# Patient Record
Sex: FEMALE | Race: WHITE | Employment: FULL TIME | URBAN - METROPOLITAN AREA
[De-identification: names, ages, dates, MRNs, and addresses within clinical notes are randomized per-mention and may not be internally consistent; named-entity substitution may affect disease eponyms.]

---

## 2020-06-04 ENCOUNTER — OFFICE VISIT (OUTPATIENT)
Dept: ORTHOPEDIC SURGERY | Age: 40
End: 2020-06-04

## 2020-06-04 VITALS — TEMPERATURE: 97.7 F | BODY MASS INDEX: 25.77 KG/M2 | HEIGHT: 70 IN | WEIGHT: 180 LBS

## 2020-06-04 PROCEDURE — 99999 PR OFFICE/OUTPT VISIT,PROCEDURE ONLY: CPT | Performed by: ORTHOPAEDIC SURGERY

## 2020-06-04 NOTE — PROGRESS NOTES
clubs or organizations: None     Relationship status: None    Intimate partner violence     Fear of current or ex partner: None     Emotionally abused: None     Physically abused: None     Forced sexual activity: None   Other Topics Concern    None   Social History Narrative    None     No current outpatient medications on file. No current facility-administered medications for this visit. No Known Allergies    REVIEW OF SYSTEMS:   No rashes today  No new numbness  No tingling  No fevers  No depression  No new onset of pain      Pertinent items are noted in HPI  Review of systems reviewed from Patient History Form dated on 6/4/2020 and available in the patient's chart under the Media tab. Examination:    Vitals: Temperature 97.7 °F (36.5 °C), height 5' 10\" (1.778 m), weight 180 lb (81.6 kg). ,  BMI Readings from Last 3 Encounters:   06/04/20 25.83 kg/m²     LOWER EXTREMITY EXAMINATION:  · Inspection: Local inspection shows no step-off or bruising. Hip alignment is normal. No instability is noted. · Constitutional: Patient is adequately groomed with no evidence of malnutrition  · Lymphatic: The lymphatic examination bilaterally reveals all areas to be without enlargement or induration. · Mental Status: The patient is oriented to time, place and person. The patient's mood and   affect are appropriate. · Vascular: Examination reveals no swelling or calf tenderness. Peripheral pulses are palpable and 2+. · Palpation:   No evidence of tenderness at the midline. Lumbar paraspinal tenderness No tenderness to palpation of the lumbar paraspinals  Bursal tenderness No tenderness bilaterally  There is no paraspinal spasm. · Range of Motion: very limited due to pain  · Strength:   Strength testing is 5/5 in all muscle groups tested. · Special Tests:   Straight leg raise and crossed SLR negative. Myles's testing is negative bilaterally.  FADIR's testing is negative

## 2020-06-18 ENCOUNTER — OFFICE VISIT (OUTPATIENT)
Dept: ORTHOPEDIC SURGERY | Age: 40
End: 2020-06-18

## 2020-06-18 VITALS — WEIGHT: 180 LBS | HEIGHT: 70 IN | TEMPERATURE: 97.4 F | BODY MASS INDEX: 25.77 KG/M2

## 2020-06-18 PROCEDURE — 99999 PR OFFICE/OUTPT VISIT,PROCEDURE ONLY: CPT | Performed by: ORTHOPAEDIC SURGERY

## 2020-06-18 NOTE — PROGRESS NOTES
Diagnoses: Fracture left foot fifth proximal phalanx fifth toe, infection, contusion, hip pathology, Muscle injury, bone tumor, femoral acetabular osteoarthritis,      Diagnosis fracture left foot proximal phalanx fifth toe comminuted with articular involvement      Plan: (Medical Decision Making)    Plan (Medical Decision Making):    I discussed the diagnosis and the treatment options with Rudolph Hoffman today. In Summary:  The various treatment options were outlined and discussed with Rudolph Hoffman including:  Conservative care options: physical therapy, ice, medications, bracing, and activity modification. The indications for therapeutic injections. The indications for additional imaging/laboratory studies. The indications for (possible future) interventions. After considering the various options discussed, Rudolph Hoffman elected to pursue a course of treatment that includes the followin. Medications: No further recommendations for new medications. 2. PT:  Prescribed home exercise program.    3. Further studies: No further studies. 4. Interventional: Follow-up in 2-2 and half weeks for another x-ray slowly start weaning out of the boot    5. Healthy Lifestyle Measures:  Patient education material reviewing the following was distributed to Rudolph Hoffman  Anatomic drawings  Healthy lifestyle education  Osteoporosis prevention  Advanced imaging preparedness      For further information regarding the spine conditions and to review interventional treatments the patient was directed to [facility].    6.  Follow up:  2-3 weeks    Rudolph Hoffman was instructed to call the office if her symptoms worsen or if new symptoms appear prior to the next scheduled visit. She is specifically instructed to contact the office between now & her scheduled appointment if she has concerns related to her condition or if she needs assistance in scheduling the above tests.  She is   welcome to call for an

## 2020-07-02 ENCOUNTER — OFFICE VISIT (OUTPATIENT)
Dept: ORTHOPEDIC SURGERY | Age: 40
End: 2020-07-02

## 2020-07-02 VITALS — WEIGHT: 179.9 LBS | HEIGHT: 70 IN | TEMPERATURE: 97.2 F | BODY MASS INDEX: 25.75 KG/M2

## 2020-07-02 PROCEDURE — 99999 PR OFFICE/OUTPT VISIT,PROCEDURE ONLY: CPT | Performed by: ORTHOPAEDIC SURGERY

## 2020-07-02 NOTE — PROGRESS NOTES
Other Topics Concern    Not on file   Social History Narrative    Not on file     No current outpatient medications on file. No current facility-administered medications for this visit. No Known Allergies    REVIEW OF SYSTEMS:   No rashes today  No new numbness  No tingling  No fevers  No depression  No new onset of pain      Pertinent items are noted in HPI  Review of systems reviewed from Patient History Form dated on 6/4/2020 and available in the patient's chart under the Media tab. Examination:    Vitals: There were no vitals taken for this visit.,  BMI Readings from Last 3 Encounters:   06/18/20 25.83 kg/m²   06/04/20 25.83 kg/m²     LOWER EXTREMITY EXAMINATION:  · Inspection: Local inspection shows no step-off or bruising. Hip alignment is normal. No instability is noted. · Constitutional: Patient is adequately groomed with no evidence of malnutrition  · Lymphatic: The lymphatic examination bilaterally reveals all areas to be without enlargement or induration. · Mental Status: The patient is oriented to time, place and person. The patient's mood and   affect are appropriate. · Vascular: Examination reveals no swelling or calf tenderness. Peripheral pulses are palpable and 2+. · Palpation:   No evidence of tenderness at the midline. Lumbar paraspinal tenderness No tenderness to palpation of the lumbar paraspinals  Bursal tenderness No tenderness bilaterally  There is no paraspinal spasm. · Range of Motion: pain-free ROM  · Strength:   Strength testing is 5/5 in all muscle groups tested. · Special Tests:   Straight leg raise and crossed SLR negative. Myles's testing is negative bilaterally. FADIR's testing is negative bilaterally. Slump test negative. Bowstring test negative  · Skin: There are no rashes, ulcerations or lesions. · Reflexes: Reflexes are symmetrically 2+ at the patellar and ankle tendons.   Clonus absent bilaterally at the feet.  · Gait & station: antalgic on the left and no ataxia    · Additional Examinations:   Right Upper Extremity:  Examination of the right upper extremity does not show any tenderness, deformity or injury. Range of motion is unremarkable. There is no gross instability. There are no rashes, ulcerations or lesions. Strength and tone are normal.  Left Upper Extremity: Examination of the left upper extremity does not show any tenderness, deformity or injury. Range of motion is unremarkable. There is no gross instability. There are no rashes, ulcerations or lesions. Strength and tone are normal.  Lower Back: Examination of the lower back does not show any tenderness, deformity or injury. Range of motion is unremarkable. There is no gross instability. There are no rashes, ulcerations or lesions. Strength and tone are normal.      Associated signs and symptoms:   Neurogenic bowel or bladder symptoms:  no   Perceived weakness:  no   Difficulty walking:  yes    Additional Comments:     No swelling no deformity no signs of any angulation good range of motion no pain to palpation pedal pulses are +2/4 capillary refill is brisk sensation is intact        Diagnostic Testing:    Views AP lateral oblique and I independently reviewed these films today in my office, Body Part left fifth toe impression healed proximal phalanx fracture left fifth toe    Impression healed proximal fracture left fifth toe  MRI: Not necessary  Labs: Not at this time. Xr Foot Left (min 3 Views)    Result Date: 6/4/2020  Radiology exam is complete. No Radiologist dictation. Please follow up with ordering provider. Xr Toe Left (min 2 Views)    Result Date: 6/18/2020  Radiology exam is complete. No Radiologist dictation. Please follow up with ordering provider. No past surgical history on file. .    Office Procedures:  Orders Placed This Encounter   Procedures    XR TOE LEFT (MIN 2 VIEWS)     Standing Status:   Future     Standing Expiration Date:   2021       Previous Treatments: High tide boot, buddy tape, x-ray, X-ray, anti-inflammatories,    Differential Diagnoses: Proximal fifth phalanx fracture, infection, contusion, hip pathology, Muscle injury, bone tumor, femoral acetabular osteoarthritis,      Diagnosis healed proximal fifth phalanx fracture      Plan: (Medical Decision Making)    Plan (Medical Decision Making):    I discussed the diagnosis and the treatment options with Hilaria Mendoza today. In Summary:  The various treatment options were outlined and discussed with Hilaria Mendoza including:  Conservative care options: physical therapy, ice, medications, bracing, and activity modification. The indications for therapeutic injections. The indications for additional imaging/laboratory studies. The indications for (possible future) interventions. After considering the various options discussed, Hilaria Hunglach elected to pursue a course of treatment that includes the followin. Medications: No further recommendations for new medications. 2. PT:  Prescribed home exercise program.    3. Further studies: No further studies. 4. Interventional: Increase activities as tolerated follow-up as needed    5. Healthy Lifestyle Measures:  Patient education material reviewing the following was distributed to Hilaria Mendoza  Anatomic drawings  Healthy lifestyle education  Osteoporosis prevention  Advanced imaging preparedness      For further information regarding the spine conditions and to review interventional treatments the patient was directed to [employer].    6.  Follow up:  as needed    Hilaria Mendoza was instructed to call the office if her symptoms worsen or if new symptoms appear prior to the next scheduled visit.  She is specifically instructed to contact the office between now & her scheduled appointment if she has concerns related to her condition or if she needs assistance in scheduling the above